# Patient Record
Sex: FEMALE | Race: ASIAN | NOT HISPANIC OR LATINO
[De-identification: names, ages, dates, MRNs, and addresses within clinical notes are randomized per-mention and may not be internally consistent; named-entity substitution may affect disease eponyms.]

---

## 2021-08-10 ENCOUNTER — RESULT REVIEW (OUTPATIENT)
Age: 71
End: 2021-08-10

## 2021-08-10 ENCOUNTER — APPOINTMENT (OUTPATIENT)
Dept: CT IMAGING | Facility: HOSPITAL | Age: 71
End: 2021-08-10
Payer: MEDICAID

## 2021-08-10 ENCOUNTER — OUTPATIENT (OUTPATIENT)
Dept: OUTPATIENT SERVICES | Facility: HOSPITAL | Age: 71
LOS: 1 days | End: 2021-08-10
Payer: MEDICAID

## 2021-08-10 PROBLEM — Z00.00 ENCOUNTER FOR PREVENTIVE HEALTH EXAMINATION: Status: ACTIVE | Noted: 2021-08-10

## 2021-08-10 PROCEDURE — 75574 CT ANGIO HRT W/3D IMAGE: CPT | Mod: 26

## 2021-08-10 PROCEDURE — 75574 CT ANGIO HRT W/3D IMAGE: CPT

## 2021-08-31 VITALS
HEART RATE: 61 BPM | OXYGEN SATURATION: 98 % | RESPIRATION RATE: 16 BRPM | WEIGHT: 134.92 LBS | TEMPERATURE: 98 F | HEIGHT: 64.57 IN | SYSTOLIC BLOOD PRESSURE: 122 MMHG | DIASTOLIC BLOOD PRESSURE: 90 MMHG

## 2021-08-31 RX ORDER — CHLORHEXIDINE GLUCONATE 213 G/1000ML
1 SOLUTION TOPICAL ONCE
Refills: 0 | Status: DISCONTINUED | OUTPATIENT
Start: 2021-09-03 | End: 2021-09-04

## 2021-08-31 NOTE — H&P ADULT - NSHPLABSRESULTS_GEN_ALL_CORE
EKG: 13.6   3.77  )-----------( 164      ( 03 Sep 2021 09:51 )             41.7       09-03    141  |  103  |  19  ----------------------------<  104<H>  3.9   |  29  |  0.79    Ca    9.6      03 Sep 2021 09:50    TPro  7.8  /  Alb  4.5  /  TBili  0.8  /  DBili  x   /  AST  27  /  ALT  28  /  AlkPhos  77  09-03      PT/INR - ( 03 Sep 2021 09:50 )   PT: 11.8 sec;   INR: 0.98          PTT - ( 03 Sep 2021 09:50 )  PTT:34.5 sec    EKG: SB @ 578 bpm, no acute ischemic changes

## 2021-08-31 NOTE — H&P ADULT - ASSESSMENT
Hx obtained from daughter - reliable  71Y M, Mandarin speaking w/ PMHx of HTN, initially presented to outpt Cardiologist Dr. Gray c/o CHECO upon 1-2 steps, relieved by rest. She denies any CP, palpitations, dizziness, syncope, diaphoresis, fatigue, LE edema, orthopnea, PND, N/V, fever, chills or recent sick contact. Echo 6/8/21: LVEF 45-50%, mod concentric LVH, mild AR/MR/TR. Stress echo 7/2/21: (inconclusive) 81% MPHR achieved w/o RWMA or ischemic EKG changes, test stopped 2/2 dyspnea. CCTA 8/10/21: Total occlusion of pRCA, mod-severe stenosis of mLCx, dilated ascending aorta measuring 5.0cm.    In light of pts risk factors, CCS class III anginal equivalent symptoms and abnormal CCTA, pt now presents to [facility] for recommended cardiac catheterization with possible intervention if clinically indicated.       ASA: II				Mallampati class: II	            Anginal Class: III    Sedation Plan: Moderate    Patient Is Suitable Candidate For Sedation: Yes      Risks & benefits of procedure and sedation and risks and benefits for the alternative therapy have been explained to the patient in layman’s terms including but not limited to: allergic reaction, bleeding, infection, arrhythmia, respiratory compromise, renal and vascular compromise, limb damage, MI, CVA, emergent CABG/Vascular Surgery and death. Informed consent obtained and in chart.    Of note:        71Y M, Mandarin speaking w/ PMHx of HTN, initially presented to outpt Cardiologist Dr. Gray c/o class III anginal equivalent symptoms, found with an abnormal CCTA and now presents to [facility] for recommended cardiac catheterization with possible intervention if clinically indicated.     ASA: II				Mallampati class: II	            Anginal Class: III    Sedation Plan: Moderate    Patient Is Suitable Candidate For Sedation: Yes      Risks & benefits of procedure and sedation and risks and benefits for the alternative therapy have been explained to the patient in layman’s terms including but not limited to: allergic reaction, bleeding, infection, arrhythmia, respiratory compromise, renal and vascular compromise, limb damage, MI, CVA, emergent CABG/Vascular Surgery and death. Informed consent obtained and in chart.    Of note: Pt loaded with ASA 325mg x1, states non-compliance on home ASA) and Plavix 600mg (H/H stable, per denies any signs of acute bleeding).   --Pt noted with fine crackes at Right lower base, NS @ 50cc x 4 hours given for hydration, EF moderately reduced, Crt WNL  --Low Potassium 3.9, repleted with KCL 20 meq PO x 1 dose.

## 2021-09-03 ENCOUNTER — INPATIENT (INPATIENT)
Facility: HOSPITAL | Age: 71
LOS: 0 days | Discharge: HOME | DRG: 247 | End: 2021-09-04
Attending: INTERNAL MEDICINE | Admitting: INTERNAL MEDICINE
Payer: MEDICAID

## 2021-09-03 LAB
A1C WITH ESTIMATED AVERAGE GLUCOSE RESULT: 5.2 % — SIGNIFICANT CHANGE UP (ref 4–5.6)
ALBUMIN SERPL ELPH-MCNC: 4.5 G/DL — SIGNIFICANT CHANGE UP (ref 3.3–5)
ALP SERPL-CCNC: 77 U/L — SIGNIFICANT CHANGE UP (ref 40–120)
ALT FLD-CCNC: 28 U/L — SIGNIFICANT CHANGE UP (ref 10–45)
ANION GAP SERPL CALC-SCNC: 9 MMOL/L — SIGNIFICANT CHANGE UP (ref 5–17)
APTT BLD: 34.5 SEC — SIGNIFICANT CHANGE UP (ref 27.5–35.5)
AST SERPL-CCNC: 27 U/L — SIGNIFICANT CHANGE UP (ref 10–40)
BASOPHILS # BLD AUTO: 0.04 K/UL — SIGNIFICANT CHANGE UP (ref 0–0.2)
BASOPHILS NFR BLD AUTO: 1.1 % — SIGNIFICANT CHANGE UP (ref 0–2)
BILIRUB SERPL-MCNC: 0.8 MG/DL — SIGNIFICANT CHANGE UP (ref 0.2–1.2)
BUN SERPL-MCNC: 19 MG/DL — SIGNIFICANT CHANGE UP (ref 7–23)
CALCIUM SERPL-MCNC: 9.6 MG/DL — SIGNIFICANT CHANGE UP (ref 8.4–10.5)
CHLORIDE SERPL-SCNC: 103 MMOL/L — SIGNIFICANT CHANGE UP (ref 96–108)
CHOLEST SERPL-MCNC: 152 MG/DL — SIGNIFICANT CHANGE UP
CO2 SERPL-SCNC: 29 MMOL/L — SIGNIFICANT CHANGE UP (ref 22–31)
CREAT SERPL-MCNC: 0.79 MG/DL — SIGNIFICANT CHANGE UP (ref 0.5–1.3)
EOSINOPHIL # BLD AUTO: 0.06 K/UL — SIGNIFICANT CHANGE UP (ref 0–0.5)
EOSINOPHIL NFR BLD AUTO: 1.6 % — SIGNIFICANT CHANGE UP (ref 0–6)
ESTIMATED AVERAGE GLUCOSE: 103 MG/DL — SIGNIFICANT CHANGE UP (ref 68–114)
GLUCOSE SERPL-MCNC: 104 MG/DL — HIGH (ref 70–99)
HCT VFR BLD CALC: 41.7 % — SIGNIFICANT CHANGE UP (ref 34.5–45)
HDLC SERPL-MCNC: 59 MG/DL — SIGNIFICANT CHANGE UP
HGB BLD-MCNC: 13.6 G/DL — SIGNIFICANT CHANGE UP (ref 11.5–15.5)
IMM GRANULOCYTES NFR BLD AUTO: 0.3 % — SIGNIFICANT CHANGE UP (ref 0–1.5)
INR BLD: 0.98 — SIGNIFICANT CHANGE UP (ref 0.88–1.16)
LIPID PNL WITH DIRECT LDL SERPL: 68 MG/DL — SIGNIFICANT CHANGE UP
LYMPHOCYTES # BLD AUTO: 1.39 K/UL — SIGNIFICANT CHANGE UP (ref 1–3.3)
LYMPHOCYTES # BLD AUTO: 36.9 % — SIGNIFICANT CHANGE UP (ref 13–44)
MCHC RBC-ENTMCNC: 29.5 PG — SIGNIFICANT CHANGE UP (ref 27–34)
MCHC RBC-ENTMCNC: 32.6 GM/DL — SIGNIFICANT CHANGE UP (ref 32–36)
MCV RBC AUTO: 90.5 FL — SIGNIFICANT CHANGE UP (ref 80–100)
MONOCYTES # BLD AUTO: 0.29 K/UL — SIGNIFICANT CHANGE UP (ref 0–0.9)
MONOCYTES NFR BLD AUTO: 7.7 % — SIGNIFICANT CHANGE UP (ref 2–14)
NEUTROPHILS # BLD AUTO: 1.98 K/UL — SIGNIFICANT CHANGE UP (ref 1.8–7.4)
NEUTROPHILS NFR BLD AUTO: 52.4 % — SIGNIFICANT CHANGE UP (ref 43–77)
NON HDL CHOLESTEROL: 93 MG/DL — SIGNIFICANT CHANGE UP
NRBC # BLD: 0 /100 WBCS — SIGNIFICANT CHANGE UP (ref 0–0)
PLATELET # BLD AUTO: 164 K/UL — SIGNIFICANT CHANGE UP (ref 150–400)
POTASSIUM SERPL-MCNC: 3.9 MMOL/L — SIGNIFICANT CHANGE UP (ref 3.5–5.3)
POTASSIUM SERPL-SCNC: 3.9 MMOL/L — SIGNIFICANT CHANGE UP (ref 3.5–5.3)
PROT SERPL-MCNC: 7.8 G/DL — SIGNIFICANT CHANGE UP (ref 6–8.3)
PROTHROM AB SERPL-ACNC: 11.8 SEC — SIGNIFICANT CHANGE UP (ref 10.6–13.6)
RBC # BLD: 4.61 M/UL — SIGNIFICANT CHANGE UP (ref 3.8–5.2)
RBC # FLD: 12.6 % — SIGNIFICANT CHANGE UP (ref 10.3–14.5)
SODIUM SERPL-SCNC: 141 MMOL/L — SIGNIFICANT CHANGE UP (ref 135–145)
TRIGL SERPL-MCNC: 123 MG/DL — SIGNIFICANT CHANGE UP
WBC # BLD: 3.77 K/UL — LOW (ref 3.8–10.5)
WBC # FLD AUTO: 3.77 K/UL — LOW (ref 3.8–10.5)

## 2021-09-03 PROCEDURE — 93458 L HRT ARTERY/VENTRICLE ANGIO: CPT | Mod: 26,59

## 2021-09-03 PROCEDURE — 99221 1ST HOSP IP/OBS SF/LOW 40: CPT

## 2021-09-03 PROCEDURE — 99152 MOD SED SAME PHYS/QHP 5/>YRS: CPT

## 2021-09-03 PROCEDURE — 92928 PRQ TCAT PLMT NTRAC ST 1 LES: CPT | Mod: LC

## 2021-09-03 RX ORDER — ATORVASTATIN CALCIUM 80 MG/1
1 TABLET, FILM COATED ORAL
Qty: 0 | Refills: 0 | DISCHARGE

## 2021-09-03 RX ORDER — ATORVASTATIN CALCIUM 80 MG/1
10 TABLET, FILM COATED ORAL AT BEDTIME
Refills: 0 | Status: DISCONTINUED | OUTPATIENT
Start: 2021-09-03 | End: 2021-09-04

## 2021-09-03 RX ORDER — POTASSIUM CHLORIDE 20 MEQ
20 PACKET (EA) ORAL ONCE
Refills: 0 | Status: COMPLETED | OUTPATIENT
Start: 2021-09-03 | End: 2021-09-03

## 2021-09-03 RX ORDER — VALSARTAN 80 MG/1
1 TABLET ORAL
Qty: 0 | Refills: 0 | DISCHARGE

## 2021-09-03 RX ORDER — ASPIRIN/CALCIUM CARB/MAGNESIUM 324 MG
325 TABLET ORAL ONCE
Refills: 0 | Status: COMPLETED | OUTPATIENT
Start: 2021-09-03 | End: 2021-09-03

## 2021-09-03 RX ORDER — INFLUENZA VIRUS VACCINE 15; 15; 15; 15 UG/.5ML; UG/.5ML; UG/.5ML; UG/.5ML
0.5 SUSPENSION INTRAMUSCULAR ONCE
Refills: 0 | Status: DISCONTINUED | OUTPATIENT
Start: 2021-09-03 | End: 2021-09-03

## 2021-09-03 RX ORDER — ASPIRIN/CALCIUM CARB/MAGNESIUM 324 MG
81 TABLET ORAL DAILY
Refills: 0 | Status: DISCONTINUED | OUTPATIENT
Start: 2021-09-04 | End: 2021-09-04

## 2021-09-03 RX ORDER — CARVEDILOL PHOSPHATE 80 MG/1
6.25 CAPSULE, EXTENDED RELEASE ORAL EVERY 12 HOURS
Refills: 0 | Status: DISCONTINUED | OUTPATIENT
Start: 2021-09-03 | End: 2021-09-04

## 2021-09-03 RX ORDER — INFLUENZA VIRUS VACCINE 15; 15; 15; 15 UG/.5ML; UG/.5ML; UG/.5ML; UG/.5ML
0.7 SUSPENSION INTRAMUSCULAR ONCE
Refills: 0 | Status: DISCONTINUED | OUTPATIENT
Start: 2021-09-03 | End: 2021-09-04

## 2021-09-03 RX ORDER — CARVEDILOL PHOSPHATE 80 MG/1
1 CAPSULE, EXTENDED RELEASE ORAL
Qty: 0 | Refills: 0 | DISCHARGE

## 2021-09-03 RX ORDER — CLOPIDOGREL BISULFATE 75 MG/1
600 TABLET, FILM COATED ORAL ONCE
Refills: 0 | Status: COMPLETED | OUTPATIENT
Start: 2021-09-03 | End: 2021-09-03

## 2021-09-03 RX ORDER — NITROGLYCERIN 6.5 MG
1 CAPSULE, EXTENDED RELEASE ORAL
Qty: 0 | Refills: 0 | DISCHARGE

## 2021-09-03 RX ORDER — CLOPIDOGREL BISULFATE 75 MG/1
75 TABLET, FILM COATED ORAL DAILY
Refills: 0 | Status: DISCONTINUED | OUTPATIENT
Start: 2021-09-04 | End: 2021-09-04

## 2021-09-03 RX ORDER — SODIUM CHLORIDE 9 MG/ML
500 INJECTION INTRAMUSCULAR; INTRAVENOUS; SUBCUTANEOUS
Refills: 0 | Status: DISCONTINUED | OUTPATIENT
Start: 2021-09-03 | End: 2021-09-04

## 2021-09-03 RX ADMIN — SODIUM CHLORIDE 75 MILLILITER(S): 9 INJECTION INTRAMUSCULAR; INTRAVENOUS; SUBCUTANEOUS at 12:44

## 2021-09-03 RX ADMIN — SODIUM CHLORIDE 50 MILLILITER(S): 9 INJECTION INTRAMUSCULAR; INTRAVENOUS; SUBCUTANEOUS at 10:51

## 2021-09-03 RX ADMIN — CLOPIDOGREL BISULFATE 600 MILLIGRAM(S): 75 TABLET, FILM COATED ORAL at 10:52

## 2021-09-03 RX ADMIN — Medication 325 MILLIGRAM(S): at 10:52

## 2021-09-03 RX ADMIN — Medication 20 MILLIEQUIVALENT(S): at 10:52

## 2021-09-03 RX ADMIN — ATORVASTATIN CALCIUM 10 MILLIGRAM(S): 80 TABLET, FILM COATED ORAL at 21:42

## 2021-09-03 NOTE — CONSULT NOTE ADULT - SUBJECTIVE AND OBJECTIVE BOX
Preventive Cardiology Consultation Note    CHIEF COMPLAINT: s/p cardiac catheterization requiring cardiovascular prevention optimization and education    *history through video Mandarin *  HISTORY OF PRESENT ILLNESS:  70yo W with HTN, HL who presented with dyspnea on exertion and underwent CCTA with total occlusion of pRCA, mod-severe mLCx stenosis and dilated ascending aortic aneurysm. Now s/p C with 3vd, s/p DAVE to pLAD, OM1 and mRCA . Per note EF 45-50%.    Currently reports feeling well    Risk factors reviewed:  -HTN: reports well controlled with medication  -HL: has been on atorvastatin for many years    Lifestyle History:  Diet: eats a lot of white rice, vegetables, noodles, fish, at times beef, also eats fruits  Exercise: Used to exercise but now limited by dyspnea; now walks and does other exercises   Smoking: Patient denies any history of smoking.     PAST MEDICAL & SURGICAL HISTORY:  HTN (hypertension)    FAMILY HISTORY:   Patient denies any first degree family history of premature CAD or CVA.     Allergies:   No Known Allergies    HOME MEDICATIONS:  Aspirin Enteric Coated 81 mg oral delayed release tablet: 1 tab(s) orally once a day (03 Sep 2021 10:41)  atorvastatin 10 mg oral tablet: 1 tab(s) orally once a day (03 Sep 2021 10:41)  carvedilol 6.25 mg oral tablet: 1 tab(s) orally 2 times a day (03 Sep 2021 10:41)  hydroCHLOROthiazide 12.5 mg oral capsule: 1 cap(s) orally once a day (03 Sep 2021 10:41)  nitroglycerin 0.4 mg sublingual tablet: 1 tab(s) sublingual every 5 minutes, As Needed (03 Sep 2021 10:41)  valsartan 80 mg oral tablet: 1 tab(s) orally once a day (03 Sep 2021 10:41)    PHYSICAL EXAM:  T(C): 36.6 (09-03-21 @ 18:08), Max: 37.2 (09-03-21 @ 15:02)  T(F): 97.9 (09-03-21 @ 18:08), Max: 99 (09-03-21 @ 15:02)  HR: 60 (09-03-21 @ 15:45) (60 - 65)  BP: 145/92 (09-03-21 @ 15:45) (145/92 - 185/92)  RR: 16 (09-03-21 @ 15:45) (16 - 16)  SpO2: 98% (09-03-21 @ 15:45) (97% - 98%)  Wt(kg): --  Height (cm): 164 (09-03 @ 10:59)  Weight (kg): 61.2 (09-03 @ 10:59)  BMI (kg/m2): 22.8 (09-03 @ 10:59)  	  Gen- awake, conversive  Head-NCAT; eyes: no corneal arcus noted b/l; no xanthelasmas   Neck- no JVD, no carotid bruit b/l  Respiratory- respirations non-labored; clear to auscultation b/l   Cardiovascular- S1S2, RRR, no murmur, no LE edema b/l  Neurology- alert and oriented x 3, moving all extremities  Psych- normal affect; appearance, verbalizations, behaviors are appropriate   Skin- no lesions, no rashes    LABS:	                        13.6   3.77  )-----------( 164      ( 03 Sep 2021 09:51 )             41.7     09-03    141  |  103  |  19  ----------------------------<  104<H>  3.9   |  29  |  0.79    Ca    9.6      03 Sep 2021 09:50    TPro  7.8  /  Alb  4.5  /  TBili  0.8  /  DBili  x   /  AST  27  /  ALT  28  /  AlkPhos  77  09-03    Cholesterol, Serum: 152 mg/dL (09-03 @ 09:50)  HDL Cholesterol, Serum: 59 mg/dL (09-03 @ 09:50)  Triglycerides, Serum: 123 mg/dL (09-03 @ 09:50)  LDL Cholesterol, Calculated: 68 mg/dL  HbA1c: 5.2%    ASSESSMENT/RECOMMENDATIONS: 	  Patient's dietary, exercise and overall lifestyle habits were reviewed. The concept of atherosclerosis and its systemic nature was discussed with a focus on the need to get all cardiovascular risk factors to goal. At this time, I would like to make the following recommendations to optimize atherosclerotic risk factors.     Secondary Prevention Recommendations:  Anti-platelet Therapy: DAPT per interventionalist recommendation.   Lipid Therapy: LDL at goal; please ensure patient continues statin (not currently ordered).   Hypertension: Blood pressures during this stay were well-controlled.   Diet: heart healthy dietary patterns reviewed. Recommended more whole grains (brown rice instead of white rice), add fruits to diet, reduce frequency of red meats, add nuts/olive oil.   Exercise: Recommended gradually increasing activity to 30-45 minutes most days of the week once cleared by referring cardiologist. Cardiac rehab might benefit this patient and is covered by major insurance plans (other than co-pays), please refer.   Glucometabolic State: Patient's blood sugar is well-controlled at this time. HbA1c today was 5.2%.     Thank you for the opportunity to see this patient. Please feel free to contact Prevention if there are any questions, or if you feel that your patient would benefit from continued follow-up visits with the Program.    Ashley Lebron MD  Cardiovascular Prevention

## 2021-09-04 ENCOUNTER — TRANSCRIPTION ENCOUNTER (OUTPATIENT)
Age: 71
End: 2021-09-04

## 2021-09-04 VITALS — TEMPERATURE: 98 F

## 2021-09-04 LAB
ANION GAP SERPL CALC-SCNC: 12 MMOL/L — SIGNIFICANT CHANGE UP (ref 5–17)
BUN SERPL-MCNC: 18 MG/DL — SIGNIFICANT CHANGE UP (ref 7–23)
CALCIUM SERPL-MCNC: 9.5 MG/DL — SIGNIFICANT CHANGE UP (ref 8.4–10.5)
CHLORIDE SERPL-SCNC: 102 MMOL/L — SIGNIFICANT CHANGE UP (ref 96–108)
CO2 SERPL-SCNC: 26 MMOL/L — SIGNIFICANT CHANGE UP (ref 22–31)
CREAT SERPL-MCNC: 0.72 MG/DL — SIGNIFICANT CHANGE UP (ref 0.5–1.3)
GLUCOSE SERPL-MCNC: 112 MG/DL — HIGH (ref 70–99)
HCT VFR BLD CALC: 41.4 % — SIGNIFICANT CHANGE UP (ref 34.5–45)
HCV AB S/CO SERPL IA: 0.04 S/CO — SIGNIFICANT CHANGE UP
HCV AB SERPL-IMP: SIGNIFICANT CHANGE UP
HGB BLD-MCNC: 13.5 G/DL — SIGNIFICANT CHANGE UP (ref 11.5–15.5)
MAGNESIUM SERPL-MCNC: 2.5 MG/DL — SIGNIFICANT CHANGE UP (ref 1.6–2.6)
MCHC RBC-ENTMCNC: 29.5 PG — SIGNIFICANT CHANGE UP (ref 27–34)
MCHC RBC-ENTMCNC: 32.6 GM/DL — SIGNIFICANT CHANGE UP (ref 32–36)
MCV RBC AUTO: 90.4 FL — SIGNIFICANT CHANGE UP (ref 80–100)
NRBC # BLD: 0 /100 WBCS — SIGNIFICANT CHANGE UP (ref 0–0)
PLATELET # BLD AUTO: 158 K/UL — SIGNIFICANT CHANGE UP (ref 150–400)
POTASSIUM SERPL-MCNC: 3.8 MMOL/L — SIGNIFICANT CHANGE UP (ref 3.5–5.3)
POTASSIUM SERPL-SCNC: 3.8 MMOL/L — SIGNIFICANT CHANGE UP (ref 3.5–5.3)
RBC # BLD: 4.58 M/UL — SIGNIFICANT CHANGE UP (ref 3.8–5.2)
RBC # FLD: 12.6 % — SIGNIFICANT CHANGE UP (ref 10.3–14.5)
SODIUM SERPL-SCNC: 140 MMOL/L — SIGNIFICANT CHANGE UP (ref 135–145)
WBC # BLD: 4.78 K/UL — SIGNIFICANT CHANGE UP (ref 3.8–10.5)
WBC # FLD AUTO: 4.78 K/UL — SIGNIFICANT CHANGE UP (ref 3.8–10.5)

## 2021-09-04 PROCEDURE — 85025 COMPLETE CBC W/AUTO DIFF WBC: CPT

## 2021-09-04 PROCEDURE — C1887: CPT

## 2021-09-04 PROCEDURE — 82330 ASSAY OF CALCIUM: CPT

## 2021-09-04 PROCEDURE — C1894: CPT

## 2021-09-04 PROCEDURE — 85027 COMPLETE CBC AUTOMATED: CPT

## 2021-09-04 PROCEDURE — 86803 HEPATITIS C AB TEST: CPT

## 2021-09-04 PROCEDURE — 99232 SBSQ HOSP IP/OBS MODERATE 35: CPT

## 2021-09-04 PROCEDURE — 80061 LIPID PANEL: CPT

## 2021-09-04 PROCEDURE — 83036 HEMOGLOBIN GLYCOSYLATED A1C: CPT

## 2021-09-04 PROCEDURE — C1725: CPT

## 2021-09-04 PROCEDURE — 80053 COMPREHEN METABOLIC PANEL: CPT

## 2021-09-04 PROCEDURE — 85610 PROTHROMBIN TIME: CPT

## 2021-09-04 PROCEDURE — C1874: CPT

## 2021-09-04 PROCEDURE — 86769 SARS-COV-2 COVID-19 ANTIBODY: CPT

## 2021-09-04 PROCEDURE — C1769: CPT

## 2021-09-04 PROCEDURE — 80048 BASIC METABOLIC PNL TOTAL CA: CPT

## 2021-09-04 PROCEDURE — 83735 ASSAY OF MAGNESIUM: CPT

## 2021-09-04 PROCEDURE — 36415 COLL VENOUS BLD VENIPUNCTURE: CPT

## 2021-09-04 PROCEDURE — 85730 THROMBOPLASTIN TIME PARTIAL: CPT

## 2021-09-04 RX ORDER — POTASSIUM CHLORIDE 20 MEQ
20 PACKET (EA) ORAL ONCE
Refills: 0 | Status: COMPLETED | OUTPATIENT
Start: 2021-09-04 | End: 2021-09-04

## 2021-09-04 RX ORDER — CLOPIDOGREL BISULFATE 75 MG/1
1 TABLET, FILM COATED ORAL
Qty: 30 | Refills: 11
Start: 2021-09-04 | End: 2022-08-29

## 2021-09-04 RX ORDER — ASPIRIN/CALCIUM CARB/MAGNESIUM 324 MG
1 TABLET ORAL
Qty: 30 | Refills: 11
Start: 2021-09-04 | End: 2022-08-29

## 2021-09-04 RX ORDER — ASPIRIN/CALCIUM CARB/MAGNESIUM 324 MG
1 TABLET ORAL
Qty: 0 | Refills: 0 | DISCHARGE

## 2021-09-04 RX ORDER — VALSARTAN 80 MG/1
80 TABLET ORAL DAILY
Refills: 0 | Status: DISCONTINUED | OUTPATIENT
Start: 2021-09-04 | End: 2021-09-04

## 2021-09-04 RX ORDER — PANTOPRAZOLE SODIUM 20 MG/1
1 TABLET, DELAYED RELEASE ORAL
Qty: 30 | Refills: 11
Start: 2021-09-04 | End: 2022-08-29

## 2021-09-04 RX ADMIN — CLOPIDOGREL BISULFATE 75 MILLIGRAM(S): 75 TABLET, FILM COATED ORAL at 11:33

## 2021-09-04 RX ADMIN — CARVEDILOL PHOSPHATE 6.25 MILLIGRAM(S): 80 CAPSULE, EXTENDED RELEASE ORAL at 05:45

## 2021-09-04 RX ADMIN — Medication 20 MILLIEQUIVALENT(S): at 11:37

## 2021-09-04 RX ADMIN — Medication 81 MILLIGRAM(S): at 11:34

## 2021-09-04 NOTE — DISCHARGE NOTE PROVIDER - HOSPITAL COURSE
71Y female, Mandarin speaking w/ PMHx of HTN, initially presented to outpt Cardiologist Dr. Gray c/o KESSLER upon 1-2 steps, relieved by rest. She denies any CP, palpitations, dizziness, syncope, diaphoresis, fatigue, LE edema, orthopnea, PND, N/V, fever, chills or recent sick contact. Echo 6/8/21: LVEF 45-50%, mod concentric LVH, mild AR/MR/TR. Stress echo 7/2/21: (inconclusive) 81% MPHR achieved w/o RWMA or ischemic EKG changes, test stopped 2/2 dyspnea. CCTA 8/10/21: Total occlusion of pRCA, mod-severe stenosis of mLCx, dilated ascending aorta measuring 5.0cm. In light of pts risk factors, CCS class III anginal equivalent symptoms and abnormal CCTA, pt now presents to [facility] for recommended cardiac catheterization with possible intervention if clinically indicated. s/p cardiac cath 9/3/21 DAVE x 1 pLAD (80%). DAVE x 1 OM1 (70-80%). mRCA . EDP 9, EF nl. R radial access. Pt admitted to cardiac tele overnight for observation. No events overnight, VSS, labs checked. Right radial site stable without bleeding or hematoma, distal pulse intact.  Pt will continue aspirin 81 mg QD, plavix 75 mg QD, coreg 6.25 mg BID, atorvastatin 10 mg QHS. Pt given appropriate d/c instructions and verbalized understanding. Medications sent to preferred pharmacy. Pt deemed stable for d/c per Dr. Bliss and will f/u with Dr. Gray in 1-2 weeks.    Cardiac Rehab (STEMI/NSTEMI/ACS/Unstable Angina/CHF/Chronic Stable Angina/Heart Surgery (CABG,Valve)/Post PCI):              *Education on benefits of Cardiac Rehab provided to patient: Yes         *Referral and Prescription Given for Cardiac Rehab : Yes         *Pt given list of locations & instructed to contact their insurance company to review list of participating providers          *Pt instructed to bring Cardiac Rehab prescription with them to Cardiology Follow up appointment for assistance with enrollment: Yes

## 2021-09-04 NOTE — DISCHARGE NOTE PROVIDER - NSDCCPCAREPLAN_GEN_ALL_CORE_FT
PRINCIPAL DISCHARGE DIAGNOSIS  Diagnosis: CAD (coronary artery disease)  Assessment and Plan of Treatment: You have a diagnosis of coronary artery disease and underwent a cardiac catheterization. You received a stent to your obtuse marginal coronary artery.  You have been started on Aspirin 81mg daily and Plavix (Clopidogrel) 75mg daily. The procedure was done through the wrist/groin. Please avoid any heavy lifting  (no more than 3 to 5 lbs) or strenuous activity for five days. If you develop any swelling, bleeding, hardening of the skin (hematoma formation), acute pain, numbness/tingling  in your arm  please contact your doctor immediately or call our 24/7 line: [phone].   NEVER MISS A DOSE OF ASPIRIN OR PLAVIX; IF YOU DO, YOU ARE AT RISK OF YOUR STENT CLOSING AND HAVING A HEART ATTACK. DO NOT STOP THESE TWO MEDICATIONS UNLESS INSTRUCTED TO DO SO BY YOUR CARDIOLOGIST.    Please make a follow up appointment with your cardiologist Dr Gray within 1-2 weeks of your discharge. All of your prescriptions have been sent electronically to your pharmacy.  We have provided you with a prescription for cardiac rehab which is medically supervised exercise program for your heart and has been shown to improve the quantity and quality of life of people with heart disease like yours. You should attend cardiac rehab 3 times per week for 12 weeks. We have provided you with a list of nearby facilities. Please call your insurance carrier to determine which of these facilities are covered under your plan. Please bring this prescription with you to your follow up appointment with your cardiologist who can then further assist you to enroll into a cardiac rehab program.        SECONDARY DISCHARGE DIAGNOSES  Diagnosis: HTN (hypertension)  Assessment and Plan of Treatment: You have a history of elevated blood pressure and you should continue your blood pressure medications as prescribed.

## 2021-09-04 NOTE — DISCHARGE NOTE NURSING/CASE MANAGEMENT/SOCIAL WORK - PATIENT PORTAL LINK FT
You can access the FollowMyHealth Patient Portal offered by [facility] by registering at the following website: [email or web address]. By joining [facility]’s FollowMyHealth portal, you will also be able to view your health information using other applications (apps) compatible with our system.

## 2021-09-04 NOTE — DISCHARGE NOTE PROVIDER - CARE PROVIDER_API CALL
Amol Gray)  Cardiovascular Disease  [address] [address]  Phone: [phone]  Fax: [phone]  Established Patient  Follow Up Time:

## 2021-09-04 NOTE — DISCHARGE NOTE NURSING/CASE MANAGEMENT/SOCIAL WORK - NSDCPEFALRISK_GEN_ALL_CORE
For information on Fall & injury Prevention, visit [email or web address]

## 2021-09-04 NOTE — DISCHARGE NOTE PROVIDER - NSDCMRMEDTOKEN_GEN_ALL_CORE_FT
Aspirin Enteric Coated 81 mg oral delayed release tablet: 1 tab(s) orally once a day  atorvastatin 10 mg oral tablet: 1 tab(s) orally once a day  cardiac rehabilitation : cardiac rehabilitation for coronary artery disease. 3  week for 12 weeks. Please bring to Dr Gray    [address] [place] [address] [phone]  carvedilol 6.25 mg oral tablet: 1 tab(s) orally 2 times a day  clopidogrel 75 mg oral tablet: 1 tab(s) orally once a day  hydroCHLOROthiazide 12.5 mg oral capsule: 1 cap(s) orally once a day  nitroglycerin 0.4 mg sublingual tablet: 1 tab(s) sublingual every 5 minutes, As Needed  valsartan 80 mg oral tablet: 1 tab(s) orally once a day   Aspirin Enteric Coated 81 mg oral delayed release tablet: 1 tab(s) orally once a day  atorvastatin 10 mg oral tablet: 1 tab(s) orally once a day  cardiac rehabilitation : cardiac rehabilitation for coronary artery disease. 3  week for 12 weeks. Please bring to Dr Gray    [address] [place] [address] [phone]  carvedilol 6.25 mg oral tablet: 1 tab(s) orally 2 times a day  clopidogrel 75 mg oral tablet: 1 tab(s) orally once a day  hydroCHLOROthiazide 12.5 mg oral capsule: 1 cap(s) orally once a day  nitroglycerin 0.4 mg sublingual tablet: 1 tab(s) sublingual every 5 minutes, As Needed  pantoprazole 40 mg oral delayed release tablet: 1 tab(s) orally once a day   valsartan 80 mg oral tablet: 1 tab(s) orally once a day

## 2021-09-05 LAB
CA-I BLD-SCNC: 1.09 MMOL/L — LOW (ref 1.15–1.33)
COVID-19 SPIKE DOMAIN AB INTERP: POSITIVE
COVID-19 SPIKE DOMAIN ANTIBODY RESULT: >250 U/ML — HIGH
SARS-COV-2 IGG+IGM SERPL QL IA: >250 U/ML — HIGH
SARS-COV-2 IGG+IGM SERPL QL IA: POSITIVE

## 2021-09-13 DIAGNOSIS — Z91.14 PATIENT'S OTHER NONCOMPLIANCE WITH MEDICATION REGIMEN: ICD-10-CM

## 2021-09-13 DIAGNOSIS — Z79.82 LONG TERM (CURRENT) USE OF ASPIRIN: ICD-10-CM

## 2021-09-13 DIAGNOSIS — I25.82 CHRONIC TOTAL OCCLUSION OF CORONARY ARTERY: ICD-10-CM

## 2021-09-13 DIAGNOSIS — I25.119 ATHEROSCLEROTIC HEART DISEASE OF NATIVE CORONARY ARTERY WITH UNSPECIFIED ANGINA PECTORIS: ICD-10-CM

## 2021-09-13 DIAGNOSIS — I10 ESSENTIAL (PRIMARY) HYPERTENSION: ICD-10-CM

## 2022-11-04 NOTE — H&P ADULT - HISTORY OF PRESENT ILLNESS
Pre assessment questions completed for upcoming colonoscopy procedure scheduled on 11.10.22    COVID policy reviewed. Patient to complete rapid antigen test one to two days before their scheduled procedure. Patient to bring photo of the results when they come in for their procedure.    Reviewed procedural arrival time 1000 and facility location [facility].    Designated  policy reviewed. Instructed to have someone stay 6 hours post procedure.     Anticoagulation/blood thinners? No    Electronic implanted devices? Pacemaker (aortic valve replacement) Patient asks if there is a need for pre procedure antibiotic. Advised to reach out to cardiologist for guidance on this.     Diabetic? No    Reviewed standard golytely prep instructions with patient. No fiber/iron supplements or foods that contain nuts/seeds prior to procedure.     Patient verbalized understanding and had no questions or concerns at this time.    Altagracia Cisneros RN     COVID:   Pharmacy:   Escort:   Cardiologist: Dr. Amol Gray    **Skeleton**    *Please confirm meds on arrival  72 y/o Mandarin speaking Male w/ PMHx of HTN initially presented to Cardiologist Dr. Gray c/o KESSLER and angina. Pt denies __________. Echo 6/8/21: LVEF 45-50%, mod concentric LVH, mild AR/MR/TR. Stress echo 7/2/21: (inconclusive) 81% MPHR achieved w/o RWMA or ischemic EKG changes, test stopped 2/2 dyspnea. CCTA 8/10/21: Total occlusion of pRCA, mod-severe stenosis of mLCx, dilated ascending aorta measuring 5.0cm.  In light of patient's risk factors, abnormal CCTA results, and CCS class ___ anginal/anginal-equivalent symptoms, patient is referred to [facility] for cardiac catheterization w/ possible intervention if clinically indicated. COVID:   Pharmacy:   Escort:     **SKELETON**    71Y M, Mandarin speaking w/ PMHx of HTN, initially presented to outpt Cardiologist Dr. Gray c/o KESSLER and angina. She denies any ___ CP, palpitations, dizziness, syncope, diaphoresis, fatigue, LE edema, SOB, KESSLER, orthopnea, PND, N/V, fever, chills or recent sick contact. Echo 6/8/21: LVEF 45-50%, mod concentric LVH, mild AR/MR/TR. Stress echo 7/2/21: (inconclusive) 81% MPHR achieved w/o RWMA or ischemic EKG changes, test stopped 2/2 dyspnea. CCTA 8/10/21: Total occlusion of pRCA, mod-severe stenosis of mLCx, dilated ascending aorta measuring 5.0cm.  In light of pts risk factors, CCS class __ anginal symptoms and abnormal CCTA, pt now presents to [facility] for recommended cardiac catheterization with possible intervention if clinically indicated.  COVID: Negative in HIE  Pharmacy: please confirm  Escort: Daughter    Hx obtained from daughter - reliable  71Y M, Mandarin speaking w/ PMHx of HTN, initially presented to outpt Cardiologist Dr. Gray c/o CHECO upon 1-2 steps, relieved by rest. She denies any CP, palpitations, dizziness, syncope, diaphoresis, fatigue, LE edema, orthopnea, PND, N/V, fever, chills or recent sick contact. Echo 6/8/21: LVEF 45-50%, mod concentric LVH, mild AR/MR/TR. Stress echo 7/2/21: (inconclusive) 81% MPHR achieved w/o RWMA or ischemic EKG changes, test stopped 2/2 dyspnea. CCTA 8/10/21: Total occlusion of pRCA, mod-severe stenosis of mLCx, dilated ascending aorta measuring 5.0cm.    In light of pts risk factors, CCS class III anginal equivalent symptoms and abnormal CCTA, pt now presents to [facility] for recommended cardiac catheterization with possible intervention if clinically indicated.  COVID: Negative in HIE  Pharmacy:   Escort: Daughter    Hx obtained from daughter - reliable  71Y female, Mandarin speaking w/ PMHx of HTN, initially presented to outpt Cardiologist Dr. Gray c/o CHECO upon 1-2 steps, relieved by rest. She denies any CP, palpitations, dizziness, syncope, diaphoresis, fatigue, LE edema, orthopnea, PND, N/V, fever, chills or recent sick contact. Echo 6/8/21: LVEF 45-50%, mod concentric LVH, mild AR/MR/TR. Stress echo 7/2/21: (inconclusive) 81% MPHR achieved w/o RWMA or ischemic EKG changes, test stopped 2/2 dyspnea. CCTA 8/10/21: Total occlusion of pRCA, mod-severe stenosis of mLCx, dilated ascending aorta measuring 5.0cm.    In light of pts risk factors, CCS class III anginal equivalent symptoms and abnormal CCTA, pt now presents to [facility] for recommended cardiac catheterization with possible intervention if clinically indicated.